# Patient Record
Sex: FEMALE | Race: WHITE | NOT HISPANIC OR LATINO | Employment: PART TIME | URBAN - METROPOLITAN AREA
[De-identification: names, ages, dates, MRNs, and addresses within clinical notes are randomized per-mention and may not be internally consistent; named-entity substitution may affect disease eponyms.]

---

## 2024-05-24 ASSESSMENT — PROMIS GLOBAL HEALTH SCALE
CARRYOUT_SOCIAL_ACTIVITIES: EXCELLENT
RATE_GENERAL_HEALTH: EXCELLENT
RATE_AVERAGE_PAIN: 0
RATE_MENTAL_HEALTH: EXCELLENT
RATE_PHYSICAL_HEALTH: EXCELLENT
EMOTIONAL_PROBLEMS: NEVER
CARRYOUT_PHYSICAL_ACTIVITIES: COMPLETELY
RATE_SOCIAL_SATISFACTION: EXCELLENT
RATE_QUALITY_OF_LIFE: EXCELLENT

## 2024-05-27 NOTE — PROGRESS NOTES
"Subjective   Patient ID: Gudelia Coello is a 19 y.o. female who presents for Annual Exam.    HPI   20 yo female presents as a new pt for a physical  [facility]        Is a patient of mine.  Patient just finished her second year at [employer].  She is a physical therapy major.  Graduated from [employer].  Doing well in school.  Will be working at the [employer] course this summer.    periods reg;   Denies any cramping or excessively heavy flow.  Is sexually active.  Monogamous.  Uses condoms.  Defers any other form of contraception at this time.  Denies any concerns for STDs and defers testing    immunizations:   Gardasil- had #1-not sure if she finished a series or if she wants it.  Adacel 2016  Men A- had  Men B- not yet-defers  flu shot -will get in fall    BMI 22  has been trying to exercise and eat healthy.    Used to do competitive dance    PMHx: none  PSHx:  surgery as a baby on her esoph?  Meds: none  All: NKDA  Social Hx: no tob  or drugs. Rare social etoh    Wears contacts. Sees ariana  sees her dentist for regular cleanings      She denies any chest pains, palpitations, edema, shortness of breath, abdominal pains, reflux, nausea, vomiting, diarrhea, constipation, blood in stool, melena.    Denies any mole changes.    No recent labs- defers at this time. No concerns    Review of Systems  ROS as stated in HPI    Objective   /61   Pulse 89   Temp 37 °C (98.6 °F)   Ht 1.537 m (5' 0.5\")   Wt 53.3 kg (117 lb 9.6 oz)   BMI 22.59 kg/m²     Physical Exam  Constitutional: A&O; NAD  HEENT: conjunctiva normal. EOMI; PERRLA; lids normal; TM's clear;   Neck: supple; No lymphadenopathy   Heart: RRR     Lungs: CTA bilateral   Abd: Soft, Nontender, Nondistended  Ext:  No edema;    Neuro: No gross neuro deficits     Psych: Judgment, orientation, mood, affect, insight wnl   Assessment/Plan   Problem List Items Addressed This Visit    None  Visit Diagnoses         Codes    Healthcare " maintenance    -  Primary Z00.00            Defers rest of gardasil series or men B at this time  Will get flu shot in the fall  healthy lifestyle-diet, exercise  .   Defers STD screening; safe sex.   Recommend Pap screening at 20yo  Defers checking labs at this time

## 2024-05-30 ENCOUNTER — OFFICE VISIT (OUTPATIENT)
Dept: PRIMARY CARE | Facility: CLINIC | Age: 19
End: 2024-05-30
Payer: COMMERCIAL

## 2024-05-30 VITALS
DIASTOLIC BLOOD PRESSURE: 61 MMHG | HEART RATE: 89 BPM | TEMPERATURE: 98.6 F | HEIGHT: 61 IN | BODY MASS INDEX: 22.2 KG/M2 | WEIGHT: 117.6 LBS | SYSTOLIC BLOOD PRESSURE: 104 MMHG

## 2024-05-30 DIAGNOSIS — Z00.00 HEALTHCARE MAINTENANCE: Primary | ICD-10-CM

## 2024-05-30 PROCEDURE — 99385 PREV VISIT NEW AGE 18-39: CPT | Performed by: FAMILY MEDICINE

## 2024-05-30 PROCEDURE — 1036F TOBACCO NON-USER: CPT | Performed by: FAMILY MEDICINE

## 2024-05-30 ASSESSMENT — PATIENT HEALTH QUESTIONNAIRE - PHQ9
2. FEELING DOWN, DEPRESSED OR HOPELESS: NOT AT ALL
1. LITTLE INTEREST OR PLEASURE IN DOING THINGS: NOT AT ALL
SUM OF ALL RESPONSES TO PHQ9 QUESTIONS 1 AND 2: 0